# Patient Record
Sex: FEMALE | Race: WHITE | NOT HISPANIC OR LATINO | Employment: FULL TIME | URBAN - METROPOLITAN AREA
[De-identification: names, ages, dates, MRNs, and addresses within clinical notes are randomized per-mention and may not be internally consistent; named-entity substitution may affect disease eponyms.]

---

## 2017-09-22 PROBLEM — F33.0 MAJOR DEPRESSIVE DISORDER, RECURRENT EPISODE, MILD (HCC): Status: ACTIVE | Noted: 2017-09-22

## 2017-10-25 PROBLEM — J30.2 ACUTE SEASONAL ALLERGIC RHINITIS: Status: ACTIVE | Noted: 2017-10-25

## 2017-12-16 PROBLEM — S82.202A CLOSED LEFT TIBIAL FRACTURE: Status: ACTIVE | Noted: 2017-12-16

## 2018-03-13 PROBLEM — H90.3 BILATERAL HIGH FREQUENCY SENSORINEURAL HEARING LOSS: Status: ACTIVE | Noted: 2018-03-13

## 2018-03-13 PROBLEM — H93.19 TINNITUS: Status: ACTIVE | Noted: 2018-03-13

## 2018-03-20 PROBLEM — A04.8 H. PYLORI INFECTION: Status: ACTIVE | Noted: 2018-03-20

## 2018-07-03 PROBLEM — A04.8 H. PYLORI INFECTION: Status: RESOLVED | Noted: 2018-03-20 | Resolved: 2018-07-03

## 2018-07-03 PROBLEM — H90.3 BILATERAL HIGH FREQUENCY SENSORINEURAL HEARING LOSS: Status: RESOLVED | Noted: 2018-03-13 | Resolved: 2018-07-03

## 2018-07-03 PROBLEM — S82.202A CLOSED LEFT TIBIAL FRACTURE: Status: RESOLVED | Noted: 2017-12-16 | Resolved: 2018-07-03

## 2018-07-17 PROBLEM — R74.8 ELEVATED ALKALINE PHOSPHATASE LEVEL: Status: ACTIVE | Noted: 2018-07-17

## 2018-10-10 PROBLEM — S82.873D CLOSED DISPLACED PILON FRACTURE OF TIBIA WITH ROUTINE HEALING: Status: ACTIVE | Noted: 2018-10-10

## 2019-10-08 PROBLEM — J30.2 ACUTE SEASONAL ALLERGIC RHINITIS: Chronic | Status: ACTIVE | Noted: 2017-10-25

## 2019-10-08 PROBLEM — S82.873D CLOSED DISPLACED PILON FRACTURE OF TIBIA WITH ROUTINE HEALING: Status: RESOLVED | Noted: 2018-10-10 | Resolved: 2019-10-08

## 2019-10-08 PROBLEM — F33.0 MAJOR DEPRESSIVE DISORDER, RECURRENT EPISODE, MILD (HCC): Chronic | Status: ACTIVE | Noted: 2017-09-22

## 2019-10-08 PROBLEM — R74.8 ELEVATED ALKALINE PHOSPHATASE LEVEL: Status: RESOLVED | Noted: 2018-07-17 | Resolved: 2019-10-08

## 2019-12-19 PROCEDURE — 99285 EMERGENCY DEPT VISIT HI MDM: CPT

## 2019-12-20 ENCOUNTER — HOSPITAL ENCOUNTER (EMERGENCY)
Facility: MEDICAL CENTER | Age: 54
End: 2019-12-20
Attending: EMERGENCY MEDICINE
Payer: COMMERCIAL

## 2019-12-20 ENCOUNTER — APPOINTMENT (OUTPATIENT)
Dept: RADIOLOGY | Facility: MEDICAL CENTER | Age: 54
End: 2019-12-20
Attending: EMERGENCY MEDICINE
Payer: COMMERCIAL

## 2019-12-20 VITALS
DIASTOLIC BLOOD PRESSURE: 81 MMHG | TEMPERATURE: 98.3 F | WEIGHT: 186.29 LBS | OXYGEN SATURATION: 97 % | SYSTOLIC BLOOD PRESSURE: 118 MMHG | HEART RATE: 80 BPM | BODY MASS INDEX: 28.23 KG/M2 | RESPIRATION RATE: 18 BRPM | HEIGHT: 68 IN

## 2019-12-20 DIAGNOSIS — K86.89 PANCREATIC MASS: ICD-10-CM

## 2019-12-20 DIAGNOSIS — R11.10 VOMITING AND DIARRHEA: ICD-10-CM

## 2019-12-20 DIAGNOSIS — R19.7 VOMITING AND DIARRHEA: ICD-10-CM

## 2019-12-20 LAB
ALBUMIN SERPL BCP-MCNC: 4.4 G/DL (ref 3.2–4.9)
ALBUMIN/GLOB SERPL: 1.8 G/DL
ALP SERPL-CCNC: 90 U/L (ref 30–99)
ALT SERPL-CCNC: 9 U/L (ref 2–50)
ANION GAP SERPL CALC-SCNC: 9 MMOL/L (ref 0–11.9)
AST SERPL-CCNC: 12 U/L (ref 12–45)
BASOPHILS # BLD AUTO: 0.2 % (ref 0–1.8)
BASOPHILS # BLD: 0.02 K/UL (ref 0–0.12)
BILIRUB SERPL-MCNC: 0.6 MG/DL (ref 0.1–1.5)
BUN SERPL-MCNC: 14 MG/DL (ref 8–22)
CALCIUM SERPL-MCNC: 9 MG/DL (ref 8.5–10.5)
CHLORIDE SERPL-SCNC: 108 MMOL/L (ref 96–112)
CO2 SERPL-SCNC: 21 MMOL/L (ref 20–33)
CREAT SERPL-MCNC: 0.73 MG/DL (ref 0.5–1.4)
EOSINOPHIL # BLD AUTO: 0.04 K/UL (ref 0–0.51)
EOSINOPHIL NFR BLD: 0.4 % (ref 0–6.9)
ERYTHROCYTE [DISTWIDTH] IN BLOOD BY AUTOMATED COUNT: 41.6 FL (ref 35.9–50)
GLOBULIN SER CALC-MCNC: 2.5 G/DL (ref 1.9–3.5)
GLUCOSE SERPL-MCNC: 118 MG/DL (ref 65–99)
HCT VFR BLD AUTO: 44.6 % (ref 37–47)
HGB BLD-MCNC: 14.9 G/DL (ref 12–16)
IMM GRANULOCYTES # BLD AUTO: 0.04 K/UL (ref 0–0.11)
IMM GRANULOCYTES NFR BLD AUTO: 0.4 % (ref 0–0.9)
LIPASE SERPL-CCNC: 20 U/L (ref 11–82)
LYMPHOCYTES # BLD AUTO: 0.64 K/UL (ref 1–4.8)
LYMPHOCYTES NFR BLD: 6.1 % (ref 22–41)
MCH RBC QN AUTO: 30.4 PG (ref 27–33)
MCHC RBC AUTO-ENTMCNC: 33.4 G/DL (ref 33.6–35)
MCV RBC AUTO: 91 FL (ref 81.4–97.8)
MONOCYTES # BLD AUTO: 0.27 K/UL (ref 0–0.85)
MONOCYTES NFR BLD AUTO: 2.6 % (ref 0–13.4)
NEUTROPHILS # BLD AUTO: 9.44 K/UL (ref 2–7.15)
NEUTROPHILS NFR BLD: 90.3 % (ref 44–72)
NRBC # BLD AUTO: 0 K/UL
NRBC BLD-RTO: 0 /100 WBC
PLATELET # BLD AUTO: 270 K/UL (ref 164–446)
PMV BLD AUTO: 9.7 FL (ref 9–12.9)
POTASSIUM SERPL-SCNC: 3.8 MMOL/L (ref 3.6–5.5)
PROT SERPL-MCNC: 6.9 G/DL (ref 6–8.2)
RBC # BLD AUTO: 4.9 M/UL (ref 4.2–5.4)
SODIUM SERPL-SCNC: 138 MMOL/L (ref 135–145)
WBC # BLD AUTO: 10.5 K/UL (ref 4.8–10.8)

## 2019-12-20 PROCEDURE — 85025 COMPLETE CBC W/AUTO DIFF WBC: CPT

## 2019-12-20 PROCEDURE — 80053 COMPREHEN METABOLIC PANEL: CPT

## 2019-12-20 PROCEDURE — 700111 HCHG RX REV CODE 636 W/ 250 OVERRIDE (IP): Performed by: EMERGENCY MEDICINE

## 2019-12-20 PROCEDURE — 36415 COLL VENOUS BLD VENIPUNCTURE: CPT

## 2019-12-20 PROCEDURE — 700105 HCHG RX REV CODE 258: Performed by: EMERGENCY MEDICINE

## 2019-12-20 PROCEDURE — 83690 ASSAY OF LIPASE: CPT

## 2019-12-20 PROCEDURE — 96374 THER/PROPH/DIAG INJ IV PUSH: CPT

## 2019-12-20 PROCEDURE — 96375 TX/PRO/DX INJ NEW DRUG ADDON: CPT

## 2019-12-20 PROCEDURE — 700117 HCHG RX CONTRAST REV CODE 255: Performed by: EMERGENCY MEDICINE

## 2019-12-20 PROCEDURE — 74177 CT ABD & PELVIS W/CONTRAST: CPT

## 2019-12-20 RX ORDER — SODIUM CHLORIDE 9 MG/ML
1000 INJECTION, SOLUTION INTRAVENOUS ONCE
Status: COMPLETED | OUTPATIENT
Start: 2019-12-20 | End: 2019-12-20

## 2019-12-20 RX ORDER — MORPHINE SULFATE 4 MG/ML
4 INJECTION, SOLUTION INTRAMUSCULAR; INTRAVENOUS ONCE
Status: COMPLETED | OUTPATIENT
Start: 2019-12-20 | End: 2019-12-20

## 2019-12-20 RX ORDER — ONDANSETRON 4 MG/1
4 TABLET, ORALLY DISINTEGRATING ORAL EVERY 6 HOURS PRN
Qty: 10 TAB | Refills: 0 | Status: SHIPPED | OUTPATIENT
Start: 2019-12-20 | End: 2020-04-26 | Stop reason: SDUPTHER

## 2019-12-20 RX ORDER — ONDANSETRON 2 MG/ML
4 INJECTION INTRAMUSCULAR; INTRAVENOUS ONCE
Status: COMPLETED | OUTPATIENT
Start: 2019-12-20 | End: 2019-12-20

## 2019-12-20 RX ADMIN — ONDANSETRON 4 MG: 2 INJECTION INTRAMUSCULAR; INTRAVENOUS at 02:03

## 2019-12-20 RX ADMIN — MORPHINE SULFATE 4 MG: 4 INJECTION INTRAVENOUS at 02:04

## 2019-12-20 RX ADMIN — IOHEXOL 100 ML: 350 INJECTION, SOLUTION INTRAVENOUS at 02:44

## 2019-12-20 RX ADMIN — SODIUM CHLORIDE 1000 ML: 9 INJECTION, SOLUTION INTRAVENOUS at 02:03

## 2019-12-20 NOTE — ED NOTES
Pt states she finished course of antibiotics three days ago for e-coli infection. Pt reports roommate sick 2 days ago with same signs and symptoms.

## 2019-12-20 NOTE — ED TRIAGE NOTES
Pt ambulatory to triage c/o n/v/d and generalized abdominal pain since this am. Pt states feels like diverticulitis which she has suffered from in past. vss

## 2019-12-20 NOTE — DISCHARGE INSTRUCTIONS
You were seen in the Emergency Department for abdominal pain, vomiting, diarrhea likely due to viral infection.    Labs were completed without significant acute abnormalities.  CT scan shows no acute abnormalities however there was sign of pancreatic mass which needs further imaging and follow up.    Please use 1,000mg of tylenol or 600mg of ibuprofen every 6 hours as needed for pain.    Please follow up with your primary care physician for scheduling of pancreatic MRI.    Return to the Emergency Department with persistent fevers, worsening abdominal pain, persistent vomiting, or other concerns.

## 2019-12-20 NOTE — ED PROVIDER NOTES
ED Provider Note    Scribed for Ginger Shi M.D. by Christy Lo. 12/20/2019  1:51 AM    Means of arrival: Walk in   History obtained from: Patient  History limited by: None      CHIEF COMPLAINT  Chief Complaint   Patient presents with   • Abdominal Pain   • Nausea/Vomiting/Diarrhea       HPI  Paulina Jessica is a 54 y.o. female who presents to the Emergency Department for evaluation of abdominal pain, vomiting, and diarrhea. The patient reports that she developed cold symptoms throughout the day and began vomiting in the afternoon.  She has had multiple episodes of both vomiting and diarrhea today.  She reports having sick contact with a friend 2 days ago who was having episodes of emesis. She endorses associated left upper quadrant abdominal pain, headaches, diarrhea, tactile fever, chills, and a cough. The patient denies any blood in the vomit and stool.     The patient reports having recent history of E. Coli in her urine. She finished the course of antibiotics 3 days ago. She denies a history of abdominal surgery. She denies any alcohol, tobacco, or other drug use.       REVIEW OF SYSTEMS  Pertinent positive include vomiting, LUQ abdominal pain, headaches, diarrhea, tactile fever, chills, cough. Pertinent negative include no blood in the vomit or stool . All other systems reviewed and are negative.      PAST MEDICAL HISTORY   has a past medical history of Allergy, Anemia, Anxiety, Bilateral high frequency sensorineural hearing loss (3/13/2018), Bipolar disorder (HCC) (5/10/2013), Closed displaced pilon fracture of tibia with routine healing (10/10/2018), Closed left tibial fracture (12/16/2017), Depression, Depression with anxiety (5/10/2013), Dyslipidemia (5/10/2013), Elevated alkaline phosphatase level full w/u isoenzyme negative 7/2018 (7/17/2018), Elevated blood pressure (5/3/2016), Former tobacco use (5/3/2016), H. pylori infection (3/20/2018), H/O mammogram (5/25/2016), Head ache,  History of HIDA scan (2016), Hypovitaminosis D, Insomnia (5/10/2013), Migraine headache, Neck muscle spasm, Other back pain (2016), Papanicolaou smear for cervical cancer screening (2016), PTSD (post-traumatic stress disorder) (5/10/2013), PUD (peptic ulcer disease) (5/3/2016), PUD (peptic ulcer disease) (5/3/2016), Renal cyst, right (2016), Right upper quadrant pain (2016), Tachycardia (2016), and Unspecified asthma(493.90).    SOCIAL HISTORY  Social History     Tobacco Use   • Smoking status: Former Smoker     Packs/day: 0.50     Years: 10.00     Pack years: 5.00     Types: Cigarettes     Last attempt to quit: 2017     Years since quittin.0   • Smokeless tobacco: Never Used   Substance and Sexual Activity   • Alcohol use: No     Alcohol/week: 0.5 oz     Types: 1 Glasses of wine per week   • Drug use: No   • Sexual activity: Never       SURGICAL HISTORY   has a past surgical history that includes bunionectomy; tonsillectomy; colonoscopy - endo (12/15/2015); tibia nailing intramedullary (Left, 2017); endoscopy procedure (3/15/2018); and open reduction.    CURRENT MEDICATIONS  Home Medications     Reviewed by Lina Oliver R.N. (Registered Nurse) on 19 at 2308  Med List Status: Partial   Medication Last Dose Status   albuterol 108 (90 BASE) MCG/ACT Aero Soln inhalation aerosol  Active   calcium-vitamin D (CALCIUM 500 + D) 500-125 MG-UNIT Tab  Active   cetirizine (ZYRTEC) 10 MG Tab  Active   fluconazole (DIFLUCAN) 150 MG tablet  Active   fluoxetine (PROZAC) 40 MG capsule  Active   fluticasone (FLONASE) 50 MCG/ACT nasal spray  Active   fluticasone (FLOVENT HFA) 220 MCG/ACT Aerosol  Active   naproxen (NAPROSYN) 500 MG Tab  Active   nitrofurantoin monohyd macro (MACROBID) 100 MG Cap  Active   Omega-3 Fatty Acids (FISH OIL) 1000 MG Cap capsule  Active   phenazopyridine (PYRIDIUM) 200 MG Tab  Active   SUMAtriptan (IMITREX) 50 MG Tab  Active   traZODone (DESYREL) 50 MG Tab   "Active                ALLERGIES  Allergies   Allergen Reactions   • Ibuprofen Unspecified     Can take only with food- Aggravates Ulcer w/o a meal        PHYSICAL EXAM   VITAL SIGNS: BP (!) 170/84   Pulse 80   Temp 36.8 °C (98.3 °F)   Resp 18   Ht 1.727 m (5' 8\")   Wt 84.5 kg (186 lb 4.6 oz)   LMP 10/17/2014   SpO2 98%   BMI 28.33 kg/m²    Constitutional: Nontoxic appearing middle-aged female. Alert in no apparent distress.  HENT: Normocephalic, Atraumatic. Bilateral external ears normal. Nose normal.  Moist mucous membranes.  Oropharynx clear.  Eyes: Pupils are equal and reactive. Conjunctiva normal.   Neck: Supple, full range of motion  Heart: Regular rate and rhythm.  No murmurs.    Lungs: No respiratory distress, normal work of breathing. Lungs clear to auscultation bilaterally.  Abdomen Soft, no distention.  Diffuse mild abdominal tenderness which is worse in the periumbilical region.  Musculoskeletal: Atraumatic. No obvious deformities noted.  No lower extremity edema.  Skin: Warm, Dry.  No erythema, No rash.   Neurologic: Alert and oriented x3. Moving all extremities spontaneously without focal deficits.  Psychiatric: Affect normal, Mood normal, Appears appropriate and not intoxicated.      DIAGNOSTIC STUDIES      LABS  Personally reviewed by me  Labs Reviewed   CBC WITH DIFFERENTIAL - Abnormal; Notable for the following components:       Result Value    MCHC 33.4 (*)     Neutrophils-Polys 90.30 (*)     Lymphocytes 6.10 (*)     Neutrophils (Absolute) 9.44 (*)     Lymphs (Absolute) 0.64 (*)     All other components within normal limits   COMP METABOLIC PANEL - Abnormal; Notable for the following components:    Glucose 118 (*)     All other components within normal limits   LIPASE   ESTIMATED GFR         RADIOLOGY  Personally reviewed by me  CT-ABDOMEN-PELVIS WITH   Final Result         1.  Low-density lesions in the pancreatic head with mild pancreatic ductal dilatation, appearance concerning for " pancreatic head mass. Recommend follow-up MRI of the pancreas pancreatic mass protocol.   2.  Diverticulosis   3.  Hepatomegaly   4.  Atherosclerosis      These findings were discussed with the patient's clinician, Ginger Shi, on 12/20/2019 3:11 AM.          ED COURSE  Vitals:    12/19/19 2305 12/20/19 0201 12/20/19 0301 12/20/19 0331   BP: (!) 170/84 114/85 130/81 118/81   Pulse: 80 79 92 80   Resp: 18      Temp: 36.8 °C (98.3 °F)      SpO2: 98% 95% 93% 97%   Weight:       Height:             Medications administered:  Medications   NS infusion 1,000 mL (0 mL Intravenous Stopped 12/20/19 0343)   ondansetron (ZOFRAN) syringe/vial injection 4 mg (4 mg Intravenous Given 12/20/19 0203)   morphine (pf) 4 MG/ML injection 4 mg (4 mg Intravenous Given 12/20/19 0204)   iohexol (OMNIPAQUE) 350 mg/mL (100 mL Intravenous Given 12/20/19 0244)       Patient was given IV fluids for vomiting, diarrhea and possible dehydration.  IV hydration was used because oral hydration was not adequate alone.  Following fluid administration patient's symptoms and hydration status were improved.      1:51 AM Patient seen and examined at bedside. The patient presents with vomiting and left upper quadrant abdominal pain. Ordered for CT-Abdomen, pelvis, urinalysis, CBC with differential, CMP, and lipase to evaluate. Patient will be treated with NS infusion 1000 mL, Zofran 4 mg, morphine 4 mg/ml for her symptoms.     MEDICAL DECISION MAKING  Otherwise relatively healthy female presents with 1 day history of vomiting, diarrhea and abdominal pain.  She is afebrile with reassuring vital signs on arrival.  Labs are reassuring with borderline leukocytosis.  There is no evidence of transaminitis or elevated lipase concerning for pancreatitis.  No signs of dehydration or electrolyte abnormality.  Patient does not have any symptoms concerning for urinary tract infection.  Imaging was performed without evidence of bowel obstruction, perforation,  appendicitis, or diverticulitis.  Symptoms are likely due to viral or foodborne infection.      The patient does have evidence of a possible mass in the neck of the pancreas with associated pancreatic ductal dilation.  This will need further work-up with pancreatic MRI which can be completed as an outpatient as the patient has an established primary care provider.  I do not feel that this is associated with the patient's current symptoms and rather an incidental finding.    3:30 AM - Upon reassessment, patient is resting comfortably with normal vital signs.  No new complaints at this time.  She is tolerating water without difficulty.  Discussed results with patient and/or family as well as importance of close primary care follow up to schedule further imaging of pancreatic mass.  Patient understands plan of care and strict return precautions for new or changing symptoms.       The patient is referred to a primary physician for blood pressure management, diabetic screening, and for all other preventative health concerns.        DISPOSITION:  Patient will be discharged home in stable condition.    FOLLOW UP:  Elvia Oro D.O.  [address] [place] [id number] [phone]    Schedule an appointment as soon as possible for a visit   FOR SCHEDULING OF PANCREATIC MRI    [facility], Emergency Dept  [address] [facility] [id number] [phone]    If symptoms worsen      OUTPATIENT MEDICATIONS:  Discharge Medication List as of 12/20/2019  3:47 AM      START taking these medications    Details   ondansetron (ZOFRAN ODT) 4 MG TABLET DISPERSIBLE Take 1 Tab by mouth every 6 hours as needed for Nausea (vomiting)., Disp-10 Tab, R-0, Print Rx Paper               IMPRESSION  (R11.10,  R19.7) Vomiting and diarrhea  (K86.89) Pancreatic mass    Results, diagnoses, and treatment options were discussed with the patient and/or family. Patient verbalized understanding of plan of  care.       I, Christy Lo (Scribe), am scribing for, and in the presence of, Ginger Shi M.D..    Electronically signed by: Christy Lo (Solomon), 12/20/2019    Ginger EDGAR M.D. personally performed the services described in this documentation, as scribed by Christy Lo in my presence, and it is both accurate and complete.    C.    The note accurately reflects work and decisions made by me.  Ginger Shi  12/20/2019  7:02 AM

## 2019-12-20 NOTE — ED NOTES
MD at bedside prior to discharge. Pt given discharge instructions and verbalized understanding with prescription stapled to discharge paperwork, all questions are answered, signed copy in chart. PIV removed and dressed. Pt ambulatory to lobby, gait steady, discharged to self. Pt took all belongings from room.

## 2020-01-03 ENCOUNTER — HOSPITAL ENCOUNTER (EMERGENCY)
Facility: MEDICAL CENTER | Age: 55
End: 2020-01-03
Attending: EMERGENCY MEDICINE
Payer: COMMERCIAL

## 2020-01-03 VITALS
SYSTOLIC BLOOD PRESSURE: 113 MMHG | RESPIRATION RATE: 18 BRPM | DIASTOLIC BLOOD PRESSURE: 81 MMHG | TEMPERATURE: 97.5 F | BODY MASS INDEX: 27.28 KG/M2 | WEIGHT: 180 LBS | OXYGEN SATURATION: 97 % | HEIGHT: 68 IN | HEART RATE: 69 BPM

## 2020-01-03 DIAGNOSIS — R10.13 EPIGASTRIC PAIN: ICD-10-CM

## 2020-01-03 LAB
ALBUMIN SERPL BCP-MCNC: 3.8 G/DL (ref 3.2–4.9)
ALBUMIN/GLOB SERPL: 1.4 G/DL
ALP SERPL-CCNC: 78 U/L (ref 30–99)
ALT SERPL-CCNC: 10 U/L (ref 2–50)
ANION GAP SERPL CALC-SCNC: 7 MMOL/L (ref 0–11.9)
APPEARANCE UR: CLEAR
AST SERPL-CCNC: 15 U/L (ref 12–45)
BASOPHILS # BLD AUTO: 1.1 % (ref 0–1.8)
BASOPHILS # BLD: 0.05 K/UL (ref 0–0.12)
BILIRUB SERPL-MCNC: 0.4 MG/DL (ref 0.1–1.5)
BUN SERPL-MCNC: 9 MG/DL (ref 8–22)
C TRACH DNA SPEC QL NAA+PROBE: NEGATIVE
CALCIUM SERPL-MCNC: 8.8 MG/DL (ref 8.5–10.5)
CHLORIDE SERPL-SCNC: 113 MMOL/L (ref 96–112)
CO2 SERPL-SCNC: 22 MMOL/L (ref 20–33)
COLOR UR AUTO: YELLOW
CREAT SERPL-MCNC: 0.73 MG/DL (ref 0.5–1.4)
EOSINOPHIL # BLD AUTO: 0.13 K/UL (ref 0–0.51)
EOSINOPHIL NFR BLD: 2.7 % (ref 0–6.9)
ERYTHROCYTE [DISTWIDTH] IN BLOOD BY AUTOMATED COUNT: 42.9 FL (ref 35.9–50)
GLOBULIN SER CALC-MCNC: 2.8 G/DL (ref 1.9–3.5)
GLUCOSE SERPL-MCNC: 89 MG/DL (ref 65–99)
GLUCOSE UR QL STRIP.AUTO: NEGATIVE MG/DL
HCT VFR BLD AUTO: 45.5 % (ref 37–47)
HGB BLD-MCNC: 14.4 G/DL (ref 12–16)
IMM GRANULOCYTES # BLD AUTO: 0.01 K/UL (ref 0–0.11)
IMM GRANULOCYTES NFR BLD AUTO: 0.2 % (ref 0–0.9)
KETONES UR QL STRIP.AUTO: NEGATIVE MG/DL
LEUKOCYTE ESTERASE UR QL STRIP.AUTO: ABNORMAL
LIPASE SERPL-CCNC: 21 U/L (ref 11–82)
LYMPHOCYTES # BLD AUTO: 1.97 K/UL (ref 1–4.8)
LYMPHOCYTES NFR BLD: 41.4 % (ref 22–41)
MCH RBC QN AUTO: 29.3 PG (ref 27–33)
MCHC RBC AUTO-ENTMCNC: 31.6 G/DL (ref 33.6–35)
MCV RBC AUTO: 92.5 FL (ref 81.4–97.8)
MONOCYTES # BLD AUTO: 0.3 K/UL (ref 0–0.85)
MONOCYTES NFR BLD AUTO: 6.3 % (ref 0–13.4)
N GONORRHOEA DNA SPEC QL NAA+PROBE: NEGATIVE
NEUTROPHILS # BLD AUTO: 2.3 K/UL (ref 2–7.15)
NEUTROPHILS NFR BLD: 48.3 % (ref 44–72)
NITRITE UR QL STRIP.AUTO: NEGATIVE
NRBC # BLD AUTO: 0 K/UL
NRBC BLD-RTO: 0 /100 WBC
PH UR STRIP.AUTO: 5.5 [PH] (ref 5–8)
PLATELET # BLD AUTO: 297 K/UL (ref 164–446)
PMV BLD AUTO: 9.5 FL (ref 9–12.9)
POTASSIUM SERPL-SCNC: 4.2 MMOL/L (ref 3.6–5.5)
PROT SERPL-MCNC: 6.6 G/DL (ref 6–8.2)
PROT UR QL STRIP: NEGATIVE MG/DL
RBC # BLD AUTO: 4.92 M/UL (ref 4.2–5.4)
RBC UR QL AUTO: ABNORMAL
SODIUM SERPL-SCNC: 142 MMOL/L (ref 135–145)
SP GR UR: 1.02 (ref 1–1.03)
SPECIMEN SOURCE: NORMAL
WBC # BLD AUTO: 4.8 K/UL (ref 4.8–10.8)

## 2020-01-03 PROCEDURE — 83690 ASSAY OF LIPASE: CPT

## 2020-01-03 PROCEDURE — 80053 COMPREHEN METABOLIC PANEL: CPT

## 2020-01-03 PROCEDURE — 81002 URINALYSIS NONAUTO W/O SCOPE: CPT

## 2020-01-03 PROCEDURE — 87491 CHLMYD TRACH DNA AMP PROBE: CPT

## 2020-01-03 PROCEDURE — 700111 HCHG RX REV CODE 636 W/ 250 OVERRIDE (IP): Performed by: EMERGENCY MEDICINE

## 2020-01-03 PROCEDURE — 99284 EMERGENCY DEPT VISIT MOD MDM: CPT

## 2020-01-03 PROCEDURE — 87591 N.GONORRHOEAE DNA AMP PROB: CPT

## 2020-01-03 PROCEDURE — 85025 COMPLETE CBC W/AUTO DIFF WBC: CPT

## 2020-01-03 RX ORDER — ONDANSETRON 4 MG/1
4 TABLET, ORALLY DISINTEGRATING ORAL ONCE
Status: COMPLETED | OUTPATIENT
Start: 2020-01-03 | End: 2020-01-03

## 2020-01-03 RX ORDER — ONDANSETRON 2 MG/ML
4 INJECTION INTRAMUSCULAR; INTRAVENOUS ONCE
Status: DISCONTINUED | OUTPATIENT
Start: 2020-01-03 | End: 2020-01-03

## 2020-01-03 RX ADMIN — ONDANSETRON 4 MG: 4 TABLET, ORALLY DISINTEGRATING ORAL at 12:51

## 2020-01-03 ASSESSMENT — PAIN SCALES - WONG BAKER: WONGBAKER_NUMERICALRESPONSE: HURTS EVEN MORE

## 2020-01-03 NOTE — ED NOTES
"Pt clear for d/c. Educated on d/c instructions, verbalized understanding. Pt requesting medication RX\"s for anxiety, Dr. Arcos aware, no new d/c orders. Pt educated to take OTC benadryl. Ambulated out of ED w/ steady gait.   "

## 2020-01-03 NOTE — ED PROVIDER NOTES
"ED Provider Note    CHIEF COMPLAINT  Chief Complaint   Patient presents with   • Sent by MD     was told by pcp to get an MRI. found a growth and lesions in pancreas in Ultrasound.   • Abdominal Pain     in mid abdomen since last night. describes pain as intermittent sharp intermittent and \"something is moving inside\"   • Nausea       HPI  Paulina Jessica is a 54 y.o. female who presents with abdominal pain.  The patient states that started little over a week ago when she started having some vomiting, abdominal pain, and diarrhea.  The patient was evaluated in the emergency department and had significant improvement after supportive treatment.  She was found to have an incidental pancreatic mass.  She is instructed to follow-up with MRCP on an outpatient basis.  The patient started having some recurrent nausea and abdominal pain and therefore she went to a [facility] where she was evaluated had an ultrasound that did show evidence of the pancreatic mass with a slight increase in dilatation of the common bile duct.  The patient was scheduled for an outpatient MRCP however she cannot have this done until approximately 10 January and [place].  She is instructed to come down to [place] for further evaluation and treatment.  Currently she has mild cramping abdominal pain it seems to be worse with ambulation.  She continues have some loose stools.  She also continues to have some nausea.  She has not had any recent weight loss.  She has not had any fevers.    REVIEW OF SYSTEMS  See HPI for further details. All other systems are negative.     PAST MEDICAL HISTORY  Past Medical History:   Diagnosis Date   • Closed displaced pilon fracture of tibia with routine healing 10/10/2018   • Elevated alkaline phosphatase level full w/u isoenzyme negative 7/2018 7/17/2018   • H. pylori infection 3/20/2018   • Bilateral high frequency sensorineural hearing loss 3/13/2018   • Closed left tibial fracture 12/16/2017   • " Papanicolaou smear for cervical cancer screening 2016    pap (2016):  NEGATIVE FOR INTRAEPITHELIAL LESION AND MALIGNANCY, HPV HR negative   • Tachycardia 2016   • Other back pain 2016   • History of HIDA scan 2016    hida scan (2016):  1.  Normal hepatobiliary imaging.  2.  Normal gallbladder EF 87%   • Right upper quadrant pain 2016    abd u/s (2016):  8 mm common bile duct, is considered slightly enlarged.  No evidence of cholelithiasis.  1.9 cm simple appearing right renal cyst   • Renal cyst, right 2016   • H/O mammogram 2016    mammogram (2016):  BIRADS category 1, negative mammogram.   • Elevated blood pressure 5/3/2016   • Former tobacco use 5/3/2016   • PUD (peptic ulcer disease) 5/3/2016   • PUD (peptic ulcer disease) 5/3/2016   • Bipolar disorder (HCC) 5/10/2013   • Dyslipidemia 5/10/2013   • PTSD (post-traumatic stress disorder) 5/10/2013   • Depression with anxiety 5/10/2013   • Insomnia 5/10/2013   • Allergy    • Anemia    • Anxiety    • Depression    • Head ache    • Hypovitaminosis D    • Migraine headache    • Neck muscle spasm    • Unspecified asthma(493.90)        FAMILY HISTORY  [unfilled]    SOCIAL HISTORY  Social History     Socioeconomic History   • Marital status:      Spouse name: Not on file   • Number of children: 1   • Years of education: 14   • Highest education level: Not on file   Occupational History   • Not on file   Social Needs   • Financial resource strain: Not on file   • Food insecurity:     Worry: Not on file     Inability: Not on file   • Transportation needs:     Medical: Not on file     Non-medical: Not on file   Tobacco Use   • Smoking status: Former Smoker     Packs/day: 0.50     Years: 10.00     Pack years: 5.00     Types: Cigarettes     Last attempt to quit: 2017     Years since quittin.1   • Smokeless tobacco: Never Used   Substance and Sexual Activity   • Alcohol use: No     Alcohol/week: 0.5 oz     Types: 1  Glasses of wine per week   • Drug use: No   • Sexual activity: Never   Lifestyle   • Physical activity:     Days per week: Not on file     Minutes per session: Not on file   • Stress: Not on file   Relationships   • Social connections:     Talks on phone: Not on file     Gets together: Not on file     Attends [employer] service: Not on file     Active member of club or organization: Not on file     Attends meetings of clubs or organizations: Not on file     Relationship status: Not on file   • Intimate partner violence:     Fear of current or ex partner: Not on file     Emotionally abused: Not on file     Physically abused: Not on file     Forced sexual activity: Not on file   Other Topics Concern   •  Service No   • Blood Transfusions No   • Caffeine Concern Yes   • Occupational Exposure No   • Hobby Hazards No   • Sleep Concern No   • Stress Concern No   • Weight Concern Yes   • Special Diet Yes     Comment: vegetarian/vegan   • Back Care No   • Exercise Yes   • Bike Helmet No   • Seat Belt Yes   • Self-Exams Yes   Social History Narrative    Patient lives with a roommate, she is , she is on disability due to her psychiatric history       SURGICAL HISTORY  Past Surgical History:   Procedure Laterality Date   • ENDOSCOPY PROCEDURE  3/15/2018    Procedure: ENDOSCOPY PROCEDURE;  Surgeon: Alex Oleary M.D.;  Location: [facility];  Service: Gastroenterology   • TIBIA NAILING INTRAMEDULLARY Left 12/16/2017    Procedure: TIBIA NAILING INTRAMEDULLARY, PERCUTANEOUS PINNING DISTAL TIBIA;  Surgeon: Edmond Mendoza M.D.;  Location: [facility];  Service:    • COLONOSCOPY - ENDO  12/15/2015    Procedure: COLONOSCOPY - ENDO;  Surgeon: Alex Oleary M.D.;  Location: [facility];  Service:    • BUNIONECTOMY     • OPEN REDUCTION     • TONSILLECTOMY         CURRENT MEDICATIONS  Home Medications     Reviewed by Pita Henderson R.N. (Registered Nurse) on 01/03/20 at 1110  [facility]  "Status: <None>   Medication Last Dose Status   albuterol 108 (90 BASE) MCG/ACT Aero Soln inhalation aerosol  Active   calcium-vitamin D (CALCIUM 500 + D) 500-125 MG-UNIT Tab  Active   cetirizine (ZYRTEC) 10 MG Tab  Active   famotidine (PEPCID) 20 MG Tab  Active   fluoxetine (PROZAC) 40 MG capsule  Active   fluticasone (FLONASE) 50 MCG/ACT nasal spray  Active   fluticasone (FLOVENT HFA) 220 MCG/ACT Aerosol  Active   naproxen (NAPROSYN) 500 MG Tab  Active   Omega-3 Fatty Acids (FISH OIL) 1000 MG Cap capsule  Active   omeprazole (PRILOSEC) 40 MG delayed-release capsule  Active   ondansetron (ZOFRAN ODT) 4 MG TABLET DISPERSIBLE  Active   SUMAtriptan (IMITREX) 50 MG Tab  Active   traZODone (DESYREL) 50 MG Tab  Active                ALLERGIES  Allergies   Allergen Reactions   • Ibuprofen Unspecified     Can take only with food- Aggravates Ulcer w/o a meal        PHYSICAL EXAM  VITAL SIGNS: /73   Pulse 75   Temp 36.4 °C (97.5 °F) (Temporal)   Resp 16   Ht 1.727 m (5' 8\")   Wt 81.6 kg (180 lb)   LMP 10/17/2014   SpO2 96%   BMI 27.37 kg/m²  Room air O2: 96    Constitutional: Anxious but nontoxic.   HENT: Normocephalic, Atraumatic, Bilateral external ears normal, Oropharynx moist, No oral exudates, Nose normal.   Eyes: PERRLA, EOMI, Conjunctiva normal, No discharge.   Neck: Normal range of motion, No tenderness, Supple, No stridor.   Lymphatic: No lymphadenopathy noted.   Cardiovascular: Normal heart rate, Normal rhythm, No murmurs, No rubs, No gallops.   Thorax & Lungs: Normal breath sounds, No respiratory distress, No wheezing, No chest tenderness.   Abdomen: Bowel sounds normal, Soft, No tenderness, No masses, No pulsatile masses.   Skin: Warm, Dry, No erythema, No rash.   Back: No tenderness, No CVA tenderness.   Extremities: Intact distal pulses, No edema, No tenderness, No cyanosis, No clubbing.   Musculoskeletal: Good range of motion in all major joints. No tenderness to palpation or major deformities " noted.   Neurologic: Alert & oriented x 3, Normal motor function, Normal sensory function, No focal deficits noted.   Psychiatric: Affect normal, Judgment normal, Mood normal.     COURSE & MEDICAL DECISION MAKING  Pertinent Labs & Imaging studies reviewed. (See chart for details)  This is a 54-year-old female who presents to the emergency department for follow-up for an abnormal ultrasound and CT scan.  I did review the patient's CT scan that was performed it does show a couple very small lesions in the pancreas.  I also reviewed her recent ultrasound that showed a slight increase in the common bile duct dilatation.  However her abdomen is benign at this time and the laboratory analysis does not show any evidence of an obstructive process nor does she have a leukocytosis.  I agree this needs to be worked up on an outpatient basis however there is no emergent indication for admission nor emergent MRI.  The patient does have some recurrent nausea as well as epigastric pain I suspect she is also developed some gastritis just worried about this abnormality.  The patient has [facility] and therefore she does need to follow-up in [place] for further work-up due to her insurance from an outpatient standpoint.  Her abdomen continues to be benign on repeat examination.  She will be discharged with clear instructions to follow-up for the scheduled outpatient MRI per her primary care provider.    FINAL IMPRESSION  1.  Abdominal pain  2.  Nausea and vomiting    Disposition  The patient will be discharged in stable condition         Electronically signed by: Carlin Lundy, 1/3/2020 11:37 AM

## 2020-01-03 NOTE — ED TRIAGE NOTES
"Chief Complaint   Patient presents with   • Sent by MD     was told by pcp to get an MRI. found a growth and lesions in pancreas in Ultrasound.   • Abdominal Pain     in mid abdomen since last night. describes pain as intermittent sharp intermittent and \"something is moving inside\"   • Nausea     Here to get MRI and further evaluation. Educated on triage process. Instructed to notify staff for any worsening symptoms. Pt in NAD.  "

## 2020-02-11 PROBLEM — Q45.3 PANCREATIC ABNORMALITY: Status: ACTIVE | Noted: 2020-02-11

## 2020-02-11 PROBLEM — N63.20 LEFT BREAST MASS: Status: ACTIVE | Noted: 2020-02-11

## 2020-02-11 PROBLEM — S39.92XA LOWER BACK INJURY: Status: ACTIVE | Noted: 2020-02-11

## 2020-04-21 PROBLEM — M51.36 DDD (DEGENERATIVE DISC DISEASE), LUMBAR: Status: ACTIVE | Noted: 2020-04-21

## 2022-06-21 PROBLEM — F20.0 PARANOID SCHIZOPHRENIA (HCC): Status: ACTIVE | Noted: 2022-06-21

## 2022-06-21 PROBLEM — T74.22XS: Status: ACTIVE | Noted: 2022-06-21

## 2022-06-21 PROBLEM — R45.851 SUICIDAL IDEATION: Status: ACTIVE | Noted: 2022-06-21

## 2023-01-13 PROBLEM — S39.92XA LOWER BACK INJURY: Status: RESOLVED | Noted: 2020-02-11 | Resolved: 2023-01-13

## 2023-01-13 PROBLEM — F20.0 PARANOID SCHIZOPHRENIA (HCC): Chronic | Status: ACTIVE | Noted: 2022-06-21

## 2023-01-13 PROBLEM — Q45.3 PANCREATIC ABNORMALITY: Chronic | Status: ACTIVE | Noted: 2020-02-11

## 2023-01-13 PROBLEM — T74.22XS: Status: RESOLVED | Noted: 2022-06-21 | Resolved: 2023-01-13

## 2023-01-13 PROBLEM — N63.20 LEFT BREAST MASS: Status: RESOLVED | Noted: 2020-02-11 | Resolved: 2023-01-13
